# Patient Record
Sex: MALE | Race: BLACK OR AFRICAN AMERICAN | Employment: FULL TIME
[De-identification: names, ages, dates, MRNs, and addresses within clinical notes are randomized per-mention and may not be internally consistent; named-entity substitution may affect disease eponyms.]

---

## 2022-08-22 ENCOUNTER — OFFICE VISIT (OUTPATIENT)
Dept: INTERNAL MEDICINE CLINIC | Facility: CLINIC | Age: 47
End: 2022-08-22

## 2022-08-22 VITALS
OXYGEN SATURATION: 98 % | SYSTOLIC BLOOD PRESSURE: 201 MMHG | DIASTOLIC BLOOD PRESSURE: 139 MMHG | HEART RATE: 67 BPM | HEIGHT: 72 IN | TEMPERATURE: 97.9 F | BODY MASS INDEX: 33.26 KG/M2 | WEIGHT: 245.6 LBS

## 2022-08-22 DIAGNOSIS — F40.231 SEVERE NEEDLE PHOBIA: ICD-10-CM

## 2022-08-22 DIAGNOSIS — I10 SEVERE HYPERTENSION: ICD-10-CM

## 2022-08-22 DIAGNOSIS — Z12.11 COLON CANCER SCREENING: ICD-10-CM

## 2022-08-22 DIAGNOSIS — Z12.5 PROSTATE CANCER SCREENING: ICD-10-CM

## 2022-08-22 DIAGNOSIS — F31.61 BIPOLAR DISORDER, CURRENT EPISODE MIXED, MILD (HCC): ICD-10-CM

## 2022-08-22 PROCEDURE — 99204 OFFICE O/P NEW MOD 45 MIN: CPT | Performed by: INTERNAL MEDICINE

## 2022-08-22 RX ORDER — AMLODIPINE BESYLATE 5 MG/1
5 TABLET ORAL DAILY
Qty: 30 TABLET | Refills: 2 | Status: SHIPPED | OUTPATIENT
Start: 2022-08-22

## 2022-08-22 RX ORDER — LABETALOL 100 MG/1
100 TABLET, FILM COATED ORAL 2 TIMES DAILY
Qty: 60 TABLET | Refills: 3 | Status: SHIPPED | OUTPATIENT
Start: 2022-08-22

## 2022-08-22 RX ORDER — QUETIAPINE FUMARATE 50 MG/1
TABLET, FILM COATED ORAL
Qty: 60 TABLET | Refills: 2 | Status: SHIPPED | OUTPATIENT
Start: 2022-08-22

## 2022-08-22 ASSESSMENT — PATIENT HEALTH QUESTIONNAIRE - PHQ9
8. MOVING OR SPEAKING SO SLOWLY THAT OTHER PEOPLE COULD HAVE NOTICED. OR THE OPPOSITE, BEING SO FIGETY OR RESTLESS THAT YOU HAVE BEEN MOVING AROUND A LOT MORE THAN USUAL: 2
SUM OF ALL RESPONSES TO PHQ9 QUESTIONS 1 & 2: 4
SUM OF ALL RESPONSES TO PHQ QUESTIONS 1-9: 20
3. TROUBLE FALLING OR STAYING ASLEEP: 3
9. THOUGHTS THAT YOU WOULD BE BETTER OFF DEAD, OR OF HURTING YOURSELF: 0
5. POOR APPETITE OR OVEREATING: 3
SUM OF ALL RESPONSES TO PHQ QUESTIONS 1-9: 20
4. FEELING TIRED OR HAVING LITTLE ENERGY: 3
2. FEELING DOWN, DEPRESSED OR HOPELESS: 3
10. IF YOU CHECKED OFF ANY PROBLEMS, HOW DIFFICULT HAVE THESE PROBLEMS MADE IT FOR YOU TO DO YOUR WORK, TAKE CARE OF THINGS AT HOME, OR GET ALONG WITH OTHER PEOPLE: 2
1. LITTLE INTEREST OR PLEASURE IN DOING THINGS: 1
6. FEELING BAD ABOUT YOURSELF - OR THAT YOU ARE A FAILURE OR HAVE LET YOURSELF OR YOUR FAMILY DOWN: 2
SUM OF ALL RESPONSES TO PHQ QUESTIONS 1-9: 20
7. TROUBLE CONCENTRATING ON THINGS, SUCH AS READING THE NEWSPAPER OR WATCHING TELEVISION: 3
SUM OF ALL RESPONSES TO PHQ QUESTIONS 1-9: 20

## 2022-08-22 ASSESSMENT — ENCOUNTER SYMPTOMS
EYE PAIN: 0
RECTAL PAIN: 0
CHOKING: 0
VOICE CHANGE: 0
STRIDOR: 0

## 2022-08-22 ASSESSMENT — COLUMBIA-SUICIDE SEVERITY RATING SCALE - C-SSRS
2. HAVE YOU ACTUALLY HAD ANY THOUGHTS OF KILLING YOURSELF?: NO
1. WITHIN THE PAST MONTH, HAVE YOU WISHED YOU WERE DEAD OR WISHED YOU COULD GO TO SLEEP AND NOT WAKE UP?: NO
6. HAVE YOU EVER DONE ANYTHING, STARTED TO DO ANYTHING, OR PREPARED TO DO ANYTHING TO END YOUR LIFE?: NO

## 2022-08-22 NOTE — PROGRESS NOTES
FOLLOWUP VISIT    Subjective:    Mr. Gordon Beyer is a 52 y.o., male,   Chief Complaint   Patient presents with    New Patient       HPI:    Mr. Gordon Beyer is a 52 y.o., male who presents today for a new patient appointment. The patient has not had a PCP in years. He states that he has a history of bipolar disorder. He was previously treated with an anti-depressant / anti-psychotic combination. Symptoms went into sustained remission and he stopped taking medications and was lost to routine follow up years ago. Over the last several months he has had recurrent symptoms including mixture of depression (anhedonia, poor mood) and hypomania (irritable, excessive energy, decreased need for sleep). In addition he has had hypertension. States that that his blood pressure is typically ~ 200 / 120's. He was previously treated with various blood pressure medications including lisinopril, HCTZ, clonidine, and perhaps others but never got his blood pressure optimized and was lost to follow up. He has not symptoms of hypertension. He also has a SEVERE fear of needles. States he absolutely will NOT have his labs drawn. PERIOD. He states that he would prefer to die rather than have phlebotomy.      The following portions of the patient's history were reviewed and updated as appropriate:      Past Medical History:   Diagnosis Date    Bipolar disorder ([facility] [address]     Hypertension        Past Surgical History:   Procedure Laterality Date    KIDNEY SURGERY      as an infant for a \"kidney blockage\"       Family History   Problem Relation Age of Onset    No Known Problems Mother     Cerebral Aneurysm Father        Social History     Socioeconomic History    Marital status: Single     Spouse name: Not on file    Number of children: 1    Years of education: Not on file    Highest education level: Not on file   Occupational History     Comment: supervisor at [employer]   Tobacco Use    Smoking status: Every Day Packs/day: 0.10     Years: 20.00     Pack years: 2.00     Types: Cigarettes    Smokeless tobacco: Never   Substance and Sexual Activity    Alcohol use: Not Currently    Drug use: Not Currently    Sexual activity: Not on file   Other Topics Concern    Not on file   Social History Narrative    Not on file     Social Determinants of Health     Financial Resource Strain: Not on file   Food Insecurity: Not on file   Transportation Needs: Not on file   Physical Activity: Not on file   Stress: Not on file   Social Connections: Not on file   Intimate Partner Violence: Not on file   Housing Stability: Not on file       Current Outpatient Medications   Medication Sig Dispense Refill    amLODIPine (NORVASC) 5 MG tablet Take 1 tablet by mouth daily 30 tablet 2    labetalol (NORMODYNE) 100 MG tablet Take 1 tablet by mouth 2 times daily 60 tablet 3    QUEtiapine (SEROQUEL) 50 MG tablet Take one tablet by mouth at bedtime for 1 week then increased to 2 tablets by mouth at bedtime. 60 tablet 2     No current facility-administered medications for this visit. Allergies as of 08/22/2022    (No Known Allergies)       Review of Systems   Constitutional:  Negative for activity change and appetite change. HENT:  Negative for drooling and voice change. Eyes:  Negative for pain. Respiratory:  Negative for choking and stridor. Gastrointestinal:  Negative for rectal pain. Endocrine: Negative for polydipsia and polyphagia. Genitourinary:  Negative for enuresis and penile pain. Musculoskeletal:  Negative for gait problem and neck stiffness. Skin:  Negative for pallor. Allergic/Immunologic: Negative for immunocompromised state. Neurological:  Negative for facial asymmetry and speech difficulty. Hematological:  Does not bruise/bleed easily. Psychiatric/Behavioral:  Negative for self-injury. The patient is not hyperactive.            Patient Care Team:  Nickie Jerome MD as PCP - General (Internal Medicine)    Objective:    BP (!) 201/139 (Site: Right Upper Arm, Position: Sitting)   Pulse 67   Temp 97.9 °F (36.6 °C) (Temporal)   Ht 6' (1.829 m)   Wt 245 lb 9.6 oz (111.4 kg)   SpO2 98%   BMI 33.31 kg/m²     Physical Exam  Vitals reviewed. Constitutional:       General: He is not in acute distress. Appearance: Normal appearance. He is not toxic-appearing. HENT:      Head: Normocephalic and atraumatic. Right Ear: Tympanic membrane, ear canal and external ear normal.      Left Ear: Tympanic membrane, ear canal and external ear normal.      Nose: Nose normal.      Mouth/Throat:      Mouth: Mucous membranes are moist.      Pharynx: Oropharynx is clear. Eyes:      General: No scleral icterus. Extraocular Movements: Extraocular movements intact. Conjunctiva/sclera: Conjunctivae normal.      Pupils: Pupils are equal, round, and reactive to light. Comments: On funduscopic exam the patient has bilateral copper wiring and AV nicking consistent with chronic severe hypertension. Cardiovascular:      Rate and Rhythm: Normal rate and regular rhythm. Pulses: Normal pulses. Heart sounds: Normal heart sounds. Pulmonary:      Breath sounds: Normal breath sounds. Abdominal:      General: Abdomen is flat. Bowel sounds are normal.      Palpations: Abdomen is soft. There is no mass. Tenderness: There is no guarding or rebound. Musculoskeletal:         General: Normal range of motion. Cervical back: Normal range of motion and neck supple. Skin:     General: Skin is warm and dry. Coloration: Skin is not jaundiced. Neurological:      Mental Status: He is alert and oriented to person, place, and time. Mental status is at baseline. Psychiatric:         Behavior: Behavior normal.         Thought Content: Thought content normal.            No results found for any previous visit. Assessent & Plan:        1.  Severe hypertension  Overview:  I had a long discussion with the patient. I strongly recommended lab work. He has copper wiring and AV nicking on funduscopic examination consistent with chronic uncontrolled severe hypertension. He is manifesting end organ damage and could potentially have chronic kidney disease. He was advised that many medications require baseline labs and ongoing blood monitoring including ACE inhibitors and angiotensin receptor blockers and diuretics etc... Given his severe hypertension at a relatively young age I would also have a low threshold to screen for secondary causes of hypertension including plasma renin and aldosterone concentrations. Despite our long conversation he adamantly refuses to have any blood work drawn. We will treat him with amlodipine 5 mg daily and labetalol 100 mg twice daily. Follow-up 1 month for blood pressure check. Orders:  -     amLODIPine (NORVASC) 5 MG tablet; Take 1 tablet by mouth daily, Disp-30 tablet, R-2Normal  -     labetalol (NORMODYNE) 100 MG tablet; Take 1 tablet by mouth 2 times daily, Disp-60 tablet, R-3Normal  2. Bipolar disorder, current episode mixed, mild ([address]  Overview:  Discussed treatment options. He is not overtly manic at present and denies severe depression or suicidal ideation. We will start Seroquel monotherapy with 50 mg at bedtime for the first week then 100 mg thereafter. If he has any worsening or changing symptoms he should call immediately. I will see him back in 1 month. Will also refer to psychiatry. Orders:  -     QUEtiapine (SEROQUEL) 50 MG tablet; Take one tablet by mouth at bedtime for 1 week then increased to 2 tablets by mouth at bedtime. , Disp-60 tablet, R-2Normal  -     External Referral to Psychiatry  3. Severe needle phobia  Overview:  I had a long conversation with the patient regarding his need for blood work. At this point patient refuses phlebotomy due to a severe needle phobia.   I even offered to prescribe short acting sedatives such as alprazolam to be taken 15 to 20 minutes before labs so long as he has a  to and from blood work but he refuses this as well. He is aware his refusal to have phlebotomy could result in damage to his health or disability or death. 4. Prostate cancer screening  Overview:  Recommended annual PSA which he refuses. 5. Colon cancer screening  Overview:  The patient and I discussed colon cancer screening rationale and modalities including colonoscopy and non-invasive tests such as Cologuard. Discussed the limitations / false positives / false negatives. Patient declines colon cancer screening at this time. The patient and/or patient representative voiced understanding and agreement with the current diagnoses, recommendations, and possible side effects. Return in about 1 month (around 9/22/2022) for follow up of chronic medical problems.

## 2022-09-21 PROBLEM — Z12.11 COLON CANCER SCREENING: Status: RESOLVED | Noted: 2022-08-22 | Resolved: 2022-09-21

## 2022-09-21 PROBLEM — Z12.5 PROSTATE CANCER SCREENING: Status: RESOLVED | Noted: 2022-08-22 | Resolved: 2022-09-21
